# Patient Record
Sex: FEMALE | Race: OTHER | HISPANIC OR LATINO | URBAN - METROPOLITAN AREA
[De-identification: names, ages, dates, MRNs, and addresses within clinical notes are randomized per-mention and may not be internally consistent; named-entity substitution may affect disease eponyms.]

---

## 2021-09-05 ENCOUNTER — EMERGENCY (EMERGENCY)
Facility: HOSPITAL | Age: 51
LOS: 1 days | Discharge: HOME | End: 2021-09-05
Attending: EMERGENCY MEDICINE | Admitting: EMERGENCY MEDICINE
Payer: COMMERCIAL

## 2021-09-05 VITALS
HEART RATE: 74 BPM | DIASTOLIC BLOOD PRESSURE: 82 MMHG | TEMPERATURE: 98 F | SYSTOLIC BLOOD PRESSURE: 110 MMHG | OXYGEN SATURATION: 100 % | RESPIRATION RATE: 18 BRPM

## 2021-09-05 VITALS
HEART RATE: 90 BPM | DIASTOLIC BLOOD PRESSURE: 74 MMHG | SYSTOLIC BLOOD PRESSURE: 136 MMHG | OXYGEN SATURATION: 100 % | TEMPERATURE: 98 F | RESPIRATION RATE: 18 BRPM

## 2021-09-05 LAB
ALBUMIN SERPL ELPH-MCNC: 4.9 G/DL — SIGNIFICANT CHANGE UP (ref 3.3–5)
ALP SERPL-CCNC: 84 U/L — SIGNIFICANT CHANGE UP (ref 40–120)
ALT FLD-CCNC: 16 U/L — SIGNIFICANT CHANGE UP (ref 4–33)
ANION GAP SERPL CALC-SCNC: 12 MMOL/L — SIGNIFICANT CHANGE UP (ref 7–14)
APPEARANCE UR: CLEAR — SIGNIFICANT CHANGE UP
AST SERPL-CCNC: 21 U/L — SIGNIFICANT CHANGE UP (ref 4–32)
BASOPHILS # BLD AUTO: 0.05 K/UL — SIGNIFICANT CHANGE UP (ref 0–0.2)
BASOPHILS NFR BLD AUTO: 0.5 % — SIGNIFICANT CHANGE UP (ref 0–2)
BILIRUB DIRECT SERPL-MCNC: <0.2 MG/DL — SIGNIFICANT CHANGE UP (ref 0–0.2)
BILIRUB INDIRECT FLD-MCNC: >0.1 MG/DL — SIGNIFICANT CHANGE UP (ref 0–1)
BILIRUB SERPL-MCNC: 0.3 MG/DL — SIGNIFICANT CHANGE UP (ref 0.2–1.2)
BILIRUB SERPL-MCNC: 0.3 MG/DL — SIGNIFICANT CHANGE UP (ref 0.2–1.2)
BILIRUB UR-MCNC: NEGATIVE — SIGNIFICANT CHANGE UP
BUN SERPL-MCNC: 10 MG/DL — SIGNIFICANT CHANGE UP (ref 7–23)
CALCIUM SERPL-MCNC: 10.1 MG/DL — SIGNIFICANT CHANGE UP (ref 8.4–10.5)
CHLORIDE SERPL-SCNC: 103 MMOL/L — SIGNIFICANT CHANGE UP (ref 98–107)
CO2 SERPL-SCNC: 28 MMOL/L — SIGNIFICANT CHANGE UP (ref 22–31)
COLOR SPEC: COLORLESS — SIGNIFICANT CHANGE UP
CREAT SERPL-MCNC: 0.85 MG/DL — SIGNIFICANT CHANGE UP (ref 0.5–1.3)
DIFF PNL FLD: NEGATIVE — SIGNIFICANT CHANGE UP
EOSINOPHIL # BLD AUTO: 0.08 K/UL — SIGNIFICANT CHANGE UP (ref 0–0.5)
EOSINOPHIL NFR BLD AUTO: 0.9 % — SIGNIFICANT CHANGE UP (ref 0–6)
GLUCOSE SERPL-MCNC: 99 MG/DL — SIGNIFICANT CHANGE UP (ref 70–99)
GLUCOSE UR QL: NEGATIVE — SIGNIFICANT CHANGE UP
HCT VFR BLD CALC: 39.9 % — SIGNIFICANT CHANGE UP (ref 34.5–45)
HGB BLD-MCNC: 13 G/DL — SIGNIFICANT CHANGE UP (ref 11.5–15.5)
IANC: 5.33 K/UL — SIGNIFICANT CHANGE UP (ref 1.5–8.5)
IMM GRANULOCYTES NFR BLD AUTO: 0.2 % — SIGNIFICANT CHANGE UP (ref 0–1.5)
KETONES UR-MCNC: NEGATIVE — SIGNIFICANT CHANGE UP
LEUKOCYTE ESTERASE UR-ACNC: NEGATIVE — SIGNIFICANT CHANGE UP
LYMPHOCYTES # BLD AUTO: 3.29 K/UL — SIGNIFICANT CHANGE UP (ref 1–3.3)
LYMPHOCYTES # BLD AUTO: 36.1 % — SIGNIFICANT CHANGE UP (ref 13–44)
MAGNESIUM SERPL-MCNC: 2 MG/DL — SIGNIFICANT CHANGE UP (ref 1.6–2.6)
MCHC RBC-ENTMCNC: 28.5 PG — SIGNIFICANT CHANGE UP (ref 27–34)
MCHC RBC-ENTMCNC: 32.6 GM/DL — SIGNIFICANT CHANGE UP (ref 32–36)
MCV RBC AUTO: 87.5 FL — SIGNIFICANT CHANGE UP (ref 80–100)
MONOCYTES # BLD AUTO: 0.35 K/UL — SIGNIFICANT CHANGE UP (ref 0–0.9)
MONOCYTES NFR BLD AUTO: 3.8 % — SIGNIFICANT CHANGE UP (ref 2–14)
NEUTROPHILS # BLD AUTO: 5.33 K/UL — SIGNIFICANT CHANGE UP (ref 1.8–7.4)
NEUTROPHILS NFR BLD AUTO: 58.5 % — SIGNIFICANT CHANGE UP (ref 43–77)
NITRITE UR-MCNC: NEGATIVE — SIGNIFICANT CHANGE UP
NRBC # BLD: 0 /100 WBCS — SIGNIFICANT CHANGE UP
NRBC # FLD: 0 K/UL — SIGNIFICANT CHANGE UP
PH UR: 6.5 — SIGNIFICANT CHANGE UP (ref 5–8)
PHOSPHATE SERPL-MCNC: 4.1 MG/DL — SIGNIFICANT CHANGE UP (ref 2.5–4.5)
PLATELET # BLD AUTO: 206 K/UL — SIGNIFICANT CHANGE UP (ref 150–400)
POTASSIUM SERPL-MCNC: 4.4 MMOL/L — SIGNIFICANT CHANGE UP (ref 3.5–5.3)
POTASSIUM SERPL-SCNC: 4.4 MMOL/L — SIGNIFICANT CHANGE UP (ref 3.5–5.3)
PROT SERPL-MCNC: 8.4 G/DL — HIGH (ref 6–8.3)
PROT UR-MCNC: NEGATIVE — SIGNIFICANT CHANGE UP
RBC # BLD: 4.56 M/UL — SIGNIFICANT CHANGE UP (ref 3.8–5.2)
RBC # FLD: 12.7 % — SIGNIFICANT CHANGE UP (ref 10.3–14.5)
SODIUM SERPL-SCNC: 143 MMOL/L — SIGNIFICANT CHANGE UP (ref 135–145)
SP GR SPEC: 1.01 — SIGNIFICANT CHANGE UP (ref 1–1.05)
UROBILINOGEN FLD QL: SIGNIFICANT CHANGE UP
WBC # BLD: 9.12 K/UL — SIGNIFICANT CHANGE UP (ref 3.8–10.5)
WBC # FLD AUTO: 9.12 K/UL — SIGNIFICANT CHANGE UP (ref 3.8–10.5)

## 2021-09-05 PROCEDURE — 99284 EMERGENCY DEPT VISIT MOD MDM: CPT

## 2021-09-05 PROCEDURE — 70450 CT HEAD/BRAIN W/O DYE: CPT | Mod: 26

## 2021-09-05 PROCEDURE — 71046 X-RAY EXAM CHEST 2 VIEWS: CPT | Mod: 26

## 2021-09-05 RX ORDER — SODIUM CHLORIDE 9 MG/ML
1000 INJECTION INTRAMUSCULAR; INTRAVENOUS; SUBCUTANEOUS ONCE
Refills: 0 | Status: COMPLETED | OUTPATIENT
Start: 2021-09-05 | End: 2021-09-05

## 2021-09-05 RX ADMIN — SODIUM CHLORIDE 1000 MILLILITER(S): 9 INJECTION INTRAMUSCULAR; INTRAVENOUS; SUBCUTANEOUS at 14:40

## 2021-09-05 NOTE — ED ADULT NURSE NOTE - NSIMPLEMENTINTERV_GEN_ALL_ED
Implemented All Universal Safety Interventions:  [place] to call system. Call bell, personal items and telephone within reach. Instruct patient to call for assistance. Room bathroom lighting operational. Non-slip footwear when patient is off stretcher. Physically safe environment: no spills, clutter or unnecessary equipment. Stretcher in lowest position, wheels locked, appropriate side rails in place.

## 2021-09-05 NOTE — ED PROVIDER NOTE - PHYSICAL EXAMINATION
Gen: non toxic appearing, NAD   Head: NC/NT  Eyes: PERRL, EOMI, anicteric  ENT: airway patent, mmm  CV: RRR, +S1/S2  Resp: CTAB, symmetric breath sounds  GI: abdomen soft non-distended, NTTP  Back: no CVA tenderness  Extremities - no edema  Skin: no obvious rashes, colors changes or bruising of back, abdomen, extermiteis  Neuro: A&Ox4, following commands, speech clear, moving all four extremities spontaneously, 5/5 strength, sensation intact  Psych: appropriate mood, normal insight

## 2021-09-05 NOTE — ED PROVIDER NOTE - NS ED ROS FT
Gen: Denies fever, chills  CV: Denies chest pain  Skin: Denies rash, erythema  Resp: Denies SOB, cough  ENT: Denies nasal congestion  Eyes: Denies blurry vision  GI: Denies nausea, vomiting, diarrhea  Msk: Denies LE swelling  : Denies dysuria  Neuro: denies headache

## 2021-09-05 NOTE — ED PROVIDER NOTE - NSFOLLOWUPINSTRUCTIONS_ED_ALL_ED_FT
You were seen for headache that was resolved at the time of examination.     See attached instructions for more information.    No signs of emergency medical condition on today's workup.  Your results are attached with your discharge instructions, please review them with your primary care physician. If there is a result pending, you will receive a call if test is positive.    A presumptive diagnosis is made today, but further evaluation may be required by your primary care doctor and/or specialist for a definitive diagnosis. Therefore, follow up as directed and if symptoms change/worsen or any emergency conditions, please return to the ER.    For pain or fever you can ibuprofen (motrin, advil) or tylenol as needed, as directed on packaging.    If needed, call patient access services at [phone] to find a primary care doctor, or call at [phone] to make an appointment at the clinic.

## 2021-09-05 NOTE — ED PROVIDER NOTE - PROGRESS NOTE DETAILS
Gertrudis White DO PGY-3: no new complains. follow up with primary care physician. return precuations Gertrudis White DO PGY-3: no new complains. follow up with primary care physician. return precautions

## 2021-09-05 NOTE — ED PROVIDER NOTE - ATTENDING CONTRIBUTION TO CARE
This is a 51 y/o F PMHx viral encephalitis (s/p treatment and hospitalization in [place]) p/w dizziness and head pressure for 3 hours that has since resolved. Denies any head trauma, LOC, nausea, vomiting, fevers, chills, chest pain, SOB, abdominal pain, urinary complaints or lower extremity edema. Also endorses her palms look more yellow than normal. On exam, NAD and nonfocal neurological exam including cerebellar tests. Plan- Labs to evaluate for electrolytes, CXR/UA to r/o infectious etiology, CTH, IVF hydration, Reassess This is a 49 y/o F PMHx viral encephalitis (s/p treatment and hospitalization in [place]) p/w dizziness and head pressure for 3 hours that has since resolved. Denies any head trauma, LOC, nausea, vomiting, fevers, chills, chest pain, SOB, abdominal pain, urinary complaints or lower extremity edema. Also endorses her palms look more yellow than normal. On exam, no scleral icterus, no abdominal tenderness, NAD and nonfocal neurological exam including cerebellar tests. Plan- Labs to evaluate for electrolytes, CXR/UA to r/o infectious etiology, CTH, IVF hydration, Reassess

## 2021-09-05 NOTE — ED PROVIDER NOTE - OBJECTIVE STATEMENT
51 yo F with PMHx viral encephalitis (s/p treatment and hospitalization in [place]) presents with an episode of pressure in her head this morning that has been resolved for past few hours. Only complaining of yellowing of her palms and soles at this time. No pain any where. States she was treated for viral encephalitis in early august and wanted to be sure she is ok. No f/c/cp/sob/ab pain/n/v/d/urinary complains.   Pt insisted on her daughter translating over the phone over the translation service.

## 2021-09-05 NOTE — ED PROVIDER NOTE - PATIENT PORTAL LINK FT
You can access the FollowMyHealth Patient Portal offered by [facility] by registering at the following website: [email or web address]. By joining [facility]’s FollowMyHealth portal, you will also be able to view your health information using other applications (apps) compatible with our system.

## 2021-09-05 NOTE — ED PROVIDER NOTE - CLINICAL SUMMARY MEDICAL DECISION MAKING FREE TEXT BOX
Pt is asxs at this time. HDS. Will check for electrolyte, WBC, CT head, CxR, IVF. IF workup is negative, reassess and follow up with primary care physician.

## 2021-09-05 NOTE — ED ADULT NURSE NOTE - OBJECTIVE STATEMENT
Break coverage- Pt received to room 4 AAO x 4 c/o episode of dizziness and head pressure this AM. Pt states symptoms have resolved and denies complaints presently. REports she was recently hospitalized and treated for viral encephalopathy. Pt breathing with ease on room air, Labs sent and NS infusing, eval in progress

## 2021-09-05 NOTE — ED ADULT TRIAGE NOTE - CHIEF COMPLAINT QUOTE
pt c/o dizziness , described as weakness and lights are very bright/ pt was [place] [facility] [place] and [place]  encephalitis for 7 days/   pt states tuesday had episode of dizziness. pt feeling week, and head is fuzzy pt c/o dizziness , described as weakness and lights are very bright/ [facility] [place] [facility]/   pt states tuesday had episode of dizziness. pt feeling week, and head is fuzzy      daughter Otilia [phone]

## 2021-09-05 NOTE — ED ADULT NURSE NOTE - CHIEF COMPLAINT QUOTE
pt c/o dizziness , described as weakness and lights are very bright/ [facility] [place] [facility]/   pt states tuesday had episode of dizziness. pt feeling week, and head is fuzzy      daughter Otilia [phone]

## 2021-12-27 ENCOUNTER — INPATIENT (INPATIENT)
Facility: HOSPITAL | Age: 51
LOS: 0 days | Discharge: HOME | End: 2021-12-28
Attending: STUDENT IN AN ORGANIZED HEALTH CARE EDUCATION/TRAINING PROGRAM | Admitting: STUDENT IN AN ORGANIZED HEALTH CARE EDUCATION/TRAINING PROGRAM
Payer: SELF-PAY

## 2021-12-27 VITALS
DIASTOLIC BLOOD PRESSURE: 91 MMHG | OXYGEN SATURATION: 97 % | RESPIRATION RATE: 18 BRPM | TEMPERATURE: 98 F | SYSTOLIC BLOOD PRESSURE: 133 MMHG | HEART RATE: 114 BPM

## 2021-12-27 PROCEDURE — 70450 CT HEAD/BRAIN W/O DYE: CPT | Mod: 26,MA

## 2021-12-27 PROCEDURE — 99285 EMERGENCY DEPT VISIT HI MDM: CPT | Mod: 25

## 2021-12-27 PROCEDURE — 93010 ELECTROCARDIOGRAM REPORT: CPT

## 2021-12-27 NOTE — ED ADULT TRIAGE NOTE - CHIEF COMPLAINT QUOTE
Pt c/o L leg numbness beginning at around 10PM. Reports she woke up from sleep, felt numbness/tingling sensation, went to get out of bed and fell onto ground. Did not hit head, no LOC. Reports dizziness. Appears lethargic. Denies weakness, blurry vision, headache, nausea/vomiting. Has hx of similar symptoms in past in August 2021, was told she had encephalitis. Denies any other PMHx.  Dr. Moses called for stroke eval. Pt c/o L leg numbness beginning at around 10PM. Reports she woke up from sleep, felt numbness/tingling sensation, went to get out of bed and fell onto ground. Did not hit head, no LOC. Reports dizziness. Appears lethargic. Denies weakness, blurry vision, headache, nausea/vomiting. Has hx of similar symptoms in past in August 2021, was told she had encephalitis. Denies any other PMHx.  Dr. Moses called for stroke eval. Code stroke activated, charge RN aware.

## 2021-12-27 NOTE — ED ADULT NURSE NOTE - IS THE PATIENT ABLE TO BE SCREENED?
Wound care reviewed in detail, as well as specific red flags that would warrant immediate follow up.  Patient  verbalized understanding with rationale and agreed to plan.  To return to [facility] or PCP  For any worsening sign or symptoms.      · Keep area clean a between you and the blood. Step 1. Control bleeding  · Apply direct pressure to a cut or scrape to stop bleeding. · Allow a minor puncture wound to stop bleeding on its own, unless the bleeding is heavy. This may help clean out the wound. Step 2.  Clean Yes

## 2021-12-27 NOTE — ED ADULT NURSE NOTE - CHIEF COMPLAINT QUOTE
Pt c/o L leg numbness beginning at around 10PM. Reports she woke up from sleep, felt numbness/tingling sensation, went to get out of bed and fell onto ground. Did not hit head, no LOC. Reports dizziness. Appears lethargic. Denies weakness, blurry vision, headache, nausea/vomiting. Has hx of similar symptoms in past in August 2021, was told she had encephalitis. Denies any other PMHx.  Dr. Moses called for stroke eval. Code stroke activated, charge RN aware.

## 2021-12-27 NOTE — CONSULT NOTE ADULT - ASSESSMENT
Assessment: 51 year old F with a PMH of viral encephalitis who presented on 12/27 with left lower extremity numbness and a fall. LKN 21:00 on 12/27. Physical exam significant for mildly decreased sensation to PP in the LLE. Initial vital signs significant for HR of 114 and BP of 133/91. CTH w/o showed no acute pathology.     Impression: LLE numbness possibly secondary to right brain dysfunction of unclear etiology, rule out stroke    Plan:  Imaging:  CTA H/N w/ contrast  MRI brain w/o contrast  TTE w/ bubble   LE duplex    Medications:  Aspirin 81 mg daily for now  Consider statin and additional antiplatelet depending on imaging    Labs:  Lipid panel  Hemoglobin A1c  Hypercoagulability panel    Other:  Dysphagia screen, if fails then swallow evaluation  Neurochecks q4h  Telemetry monitoring  Blood glucose control, not to exceed 180, avoid hypoglycemia  Permissive HTN (BP not to exceed 220/120 for 24 hours, then gradual normotension  PT/OT  Outpatient stroke neurology follow up when ready for discharge ([phone], Dr. Benjamin, [address])    Case discussed with telestroke attending, Dr. Zapien  Assessment: 51 year old F with a PMH of viral encephalitis who presented on 12/27 with left lower extremity numbness and a fall. LKN 21:00 on 12/27. Physical exam significant for mildly decreased sensation to PP in the LLE. Initial vital signs significant for HR of 114 and BP of 133/91. CTH w/o showed no acute pathology. Patient not a tPA candidate due to mild nondisabling deficits. Patient not an endovascular candidate due to low NIHSS.     Impression: LLE numbness possibly secondary to right brain dysfunction of unclear etiology, rule out stroke    Plan:  Imaging:  CTA H/N w/ contrast  MRI brain w/o contrast  TTE w/ bubble   LE duplex    Medications:  Aspirin 81 mg daily for now  Consider statin and additional antiplatelet depending on imaging    Labs:  Lipid panel  Hemoglobin A1c  Hypercoagulability panel    Other:  Dysphagia screen, if fails then swallow evaluation  Neurochecks q4h  Telemetry monitoring  Blood glucose control, not to exceed 180, avoid hypoglycemia  Permissive HTN (BP not to exceed 220/120 for 24 hours, then gradual normotension  PT/OT  Outpatient stroke neurology follow up when ready for discharge ([phone], Dr. Benjamin, [address])    Case discussed with telestroke attending, Dr. Zapien  Assessment: 51 year old F with a PMH of viral encephalitis who presented on 12/27 with left lower extremity numbness and a fall. LKN 21:00 on 12/27. Physical exam significant for mildly decreased sensation to PP in the LLE. Initial vital signs significant for HR of 114 and BP of 133/91. CTH w/o showed no acute pathology, but a possible right lateral ventricle intraventricular mass versus asymmetric choroid. Patient not a tPA candidate due to mild nondisabling deficits. Patient not an endovascular candidate due to low NIHSS.     Impression: LLE numbness possibly secondary to right brain dysfunction of unclear etiology, rule out stroke    Plan:  Imaging:  MRA head w/o contrast and MRA neck with contrast (if not possible to use contrast, then obtain without contrast)  MRI brain w/ and w/o contrast (if possible, otherwise without contrast)  TTE w/ bubble   Consider LE duplex    Medications:  Aspirin 81 mg daily for now  Consider statin and additional antiplatelet depending on imaging    Labs:  Lipid panel  Hemoglobin A1c  Hypercoagulability panel    Other:  Dysphagia screen, if fails then swallow evaluation  Neurochecks q4h  Telemetry monitoring  Blood glucose not to exceed 180, avoid hypoglycemia  Permissive HTN (BP not to exceed 220/120 for 24 hours, then gradual normotension  Consider PT/OT  Outpatient stroke neurology follow up when ready for discharge ([phone], Dr. Benjamin, [address])    Case discussed with telestroke attending, Dr. Zapien  Assessment: 51 year old right-handed F with a PMH of  herpes encephalitis (c/b kidney injury, possibly medication related, peak creatinine of 6, 8/2021 in the [place]) and hypothyroidism (on synthroid) who presented on 12/27 with left lower extremity numbness and a fall. LKN 21:00 on 12/27. Physical exam significant for tachycardia and mildly decreased sensation to PP in the LLE. Initial vital signs significant for HR of 114 and BP of 133/91. Labs significant for BUN/Cr of 16/1.13. CTH w/o showed no acute pathology, but a possible right lateral ventricle intraventricular mass versus asymmetric choroid. Patient not a tPA candidate due to mild nondisabling deficits. Patient not an endovascular candidate due to low NIHSS.     Impression: LLE numbness possibly secondary to right brain dysfunction of unclear etiology, rule out stroke    Plan:  Imaging:  MRA head w/o contrast and MRA neck with contrast (if not possible to use contrast, then obtain without contrast)  MRI brain w/ and w/o contrast (if possible, otherwise without contrast)  TTE w/ bubble   Consider LE duplex    Medications:  Aspirin 81 mg daily for now  Consider statin and additional antiplatelet depending on imaging    Labs:  Lipid panel  Hemoglobin A1c  Consider Hypercoagulability panel    Other:  Dysphagia screen, if fails then swallow evaluation  Neurochecks q4h  Telemetry monitoring  Blood glucose not to exceed 180, avoid hypoglycemia  Permissive HTN (BP not to exceed 220/120 for 24 hours, then gradual normotension  Consider PT/OT  Outpatient stroke neurology follow up when ready for discharge ([phone], Dr. Benjamin, [address])    Case discussed with telestroke attending, Dr. Zapien

## 2021-12-27 NOTE — ED ADULT NURSE NOTE - OBJECTIVE STATEMENT
facilitator RN: pt received to CT areas for code stroke c/o left leg numbness from 10pm. pt [place] speaking family friend LKW 9pm when pt went to bed endorsing headache and "not feeling well" took Advil and cough suppressant. woke up and hour later to use restroom and fell as a result of leg numbness. denies head trauma, LOC. PMHx encephalitis. not vaccinated for COVID. 18G IV placed left AC, labs drawn and sent. NIHSS as noted. at this time, pt c/o feeling tired.

## 2021-12-27 NOTE — ED ADULT NURSE NOTE - NSIMPLEMENTINTERV_GEN_ALL_ED
Implemented All Universal Safety Interventions:  [place] to call system. Call bell, personal items and telephone within reach. Instruct patient to call for assistance. Room bathroom lighting operational. Non-slip footwear when patient is off stretcher. Physically safe environment: no spills, clutter or unnecessary equipment. Stretcher in lowest position, wheels locked, appropriate side rails in place.

## 2021-12-27 NOTE — CONSULT NOTE ADULT - ATTENDING COMMENTS
code stroke called in ED and neurology emergently assessed patient.   Briefly   51 year old right-handed  F with prior herpes encephalitis (c/b kidney injury, possibly medication related, peak creatinine of 6, 8/2021 in the [place]) and hypothyroidism (on synthroid) who presented on 12/27 with left lower extremity numbness and a fall. LKN 21:00 on 12/27. Physical exam significant for tachycardia and mildly decreased sensation to PP in the LLE. Initial vital signs significant for HR of 114 and BP of 133/91. Labs significant for BUN/Cr of 16/1.13.   CTH w/o showed no acute pathology, but a possible right lateral ventricle intraventricular mass versus asymmetric choroid. Patient not a tPA candidate due to mild nondisabling deficits. Patient not an endovascular candidate due to low NIHSS.   +COVID     Impression: LLE numbness now resolved.  also + COVID. doubt vascular event/TIA. more likely related to covid   Plan:  - treatment of covid per primary team  - MRI brain and MRA H/N can be done outpatient as symptoms resolved.   - suggest covid quarantine at home given omicron outbreak   - no objection to asa 81   - no need for statin  - f/u with me outpt   spoke with cdu

## 2021-12-27 NOTE — CONSULT NOTE ADULT - SUBJECTIVE AND OBJECTIVE BOX
HPI: 51 year old F with a PMH of viral encephalitis who presented on 12/27 with left lower extremity numbness and a fall. LKN 21:00 on 12/27.      NIHSS: 1  MRS: 0    REVIEW OF SYSTEMS  General:	  Skin/Breast:	  Ophthalmologic:  ENMT:	  Respiratory and Thorax:	  Cardiovascular:	  Gastrointestinal:	  Genitourinary:	  Musculoskeletal:	  Neurological:	  Psychiatric:	  Hematology/Lymphatics:	  Endocrine:	  Allergic/Immunologic:	    A 10-system ROS was performed and is negative except for those items noted above and/or in the HPI.    PAST MEDICAL & SURGICAL HISTORY:    FAMILY HISTORY:    SOCIAL HISTORY:   T/E/D:   Occupation:   Lives with:     MEDICATIONS (HOME):  Home Medications:    MEDICATIONS  (STANDING):    MEDICATIONS  (PRN):    ALLERGIES/INTOLERANCES:  Allergies  No Known Allergies    Intolerances    VITALS & EXAMINATION:  Vital Signs Last 24 Hrs  T(C): 36.9 (27 Dec 2021 23:11), Max: 36.9 (27 Dec 2021 23:11)  T(F): 98.5 (27 Dec 2021 23:11), Max: 98.5 (27 Dec 2021 23:11)  HR: 114 (27 Dec 2021 23:11) (114 - 114)  BP: 133/91 (27 Dec 2021 23:11) (133/91 - 133/91)  BP(mean): --  RR: 18 (27 Dec 2021 23:11) (18 - 18)  SpO2: 97% (27 Dec 2021 23:11) (97% - 97%)    General:  Constitutional: Obese Female, appears stated age, in no apparent distress including pain  Head: Normocephalic & atraumatic.  ENT: Patent ear canals, intact TM, mucus membranes moist & pink, neck supple, no lymphadenopathy.   Respiratory: Patent airway. All lung fields are clear to auscultation bilaterally.  Extremities: No cyanosis, clubbing, or edema.  Skin: No rashes, bruising, or discoloration.    Cardiovascular (>2): RRR no murmurs. Carotid pulsations symmetric, no bruits. Normal capillary beds refill, 1-2 seconds or less.     Neurological (>12):  MS: Awake, alert, oriented to person, place, situation, time. Normal affect. Follows all commands.    Language: Speech is clear, fluent with good repetition & comprehension (able to name objects)    CNs: PERRL (R = 3mm, L = 3mm). VFF. EOMI no nystagmus, no diplopia. V1-3 intact to LT/pinprick, No facial asymmetry b/l, full eye closure strength b/l. Hearing grossly normal (rubbing fingers) b/l. Symmetric palate elevation in midline. Gag reflex deferred. Head turning & shoulder shrug intact b/l. Tongue midline, normal movements, no atrophy.    Fundoscopic: deferred     Motor: Normal muscle bulk & tone.   No motor drift in extremities	     Sensation: decreased to PP in LLE. Intact to LT throughout     Cortical: Extinction on DSS (neglect): unable to assess with sensory deficit    Reflexes:              Biceps(C5)       BR(C6)     Triceps(C7)               Patellar(L4)    Achilles(S1)    Plantar Resp  R	2	          2	             2		        2		    2		Down   L	2	          2	             2		        2		    2		Down     Coordination: intact rapid-alt movements. No dysmetria to FTN/HTS    Gait: Normal Romberg. No postural instability. Normal stance and tandem gait.     LABORATORY:  CBC   Chem       LFTs   Coagulopathy   Lipid Panel   A1c   Cardiac enzymes     U/A   CSF  Immunological  Other    STUDIES & IMAGING:  Studies (EKG, EEG, EMG, etc):     Radiology (XR, CT, MR, U/S, TTE/LUPILLO):   HPI: 51 year old F with a PMH of herpes encephalitis (c/b kidney injury, possibly medication related, peak creatinine of 6, 8/2021 in the [place]) and hypothyroidism (on synthroid) who presented on 12/27 with left lower extremity numbness and a fall. LKN 21:00 on 12/27. Patient noticed LLE numbness upon awakening and getting out of bed around 10 pm. She subsequently fell. Patient also noticed a frontal pressure like headache, which is currently 1/10 in intensity. She denies having any weakness, vision changes, nausea, or speech changes. Patient endorsed having chest pain and pressure with sitting up, which has now resolved. She denies shortness of breath. Patient does not take any blood thinners. She is independent in ADL's and ambulates independently at baseline. Patient states that last week her creatinine was 1.8.     Patient's family friend provided Japanese interpretation per patient preference    NIHSS: 1  MRS: 0    REVIEW OF SYSTEMS  A 10-system ROS was performed and is negative except for those items noted above and/or in the HPI.    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 with: daughter    MEDICATIONS (HOME):  Home Medications:    MEDICATIONS  (STANDING):    MEDICATIONS  (PRN):    ALLERGIES/INTOLERANCES:  Allergies  No Known Allergies    Intolerances    VITALS & EXAMINATION:  Vital Signs Last 24 Hrs  T(C): 36.9 (27 Dec 2021 23:11), Max: 36.9 (27 Dec 2021 23:11)  T(F): 98.5 (27 Dec 2021 23:11), Max: 98.5 (27 Dec 2021 23:11)  HR: 114 (27 Dec 2021 23:11) (114 - 114)  BP: 133/91 (27 Dec 2021 23:11) (133/91 - 133/91)  BP(mean): --  RR: 18 (27 Dec 2021 23:11) (18 - 18)  SpO2: 97% (27 Dec 2021 23:11) (97% - 97%)    General:  Constitutional: Female, appears stated age, in no apparent distress including pain  Respiratory: Patent airway. Anterior lung fields are clear to auscultation bilaterally.  Cardiovascular (>2): tachycardic, no murmurs, rubs, clicks, or gallops.  Abdomen: normal bowel sounds in all quadrants, soft, nontender    Neurological (>12):  MS: Awake, alert, oriented to person, place, situation, time. Normal affect. Follows all commands.    Language: Speech is clear, fluent with good comprehension    CNs: PERRL (R = 3mm, L = 3mm). CVF full. EOMI no nystagmus. V1-3 intact to LT/pinprick, No facial asymmetry b/l, full eye closure strength b/l. Hearing grossly normal (rubbing fingers) b/l. Gag reflex deferred. Head turning intact b/l. Tongue midline, normal movements, no atrophy.    Fundoscopic: deferred     Motor: Normal muscle bulk & tone. No pronator drift. No motor drift in extremities.	     Sensation: decreased to PP in LLE. Intact to LT b/l throughout     Cortical: Extinction on DSS (neglect): unable to assess with sensory deficit    Reflexes:              Biceps(C5)       BR(C6)     Triceps(C7)               Patellar(L4)    Achilles(S1)    Plantar Resp  R	2	          2	             2		        3		    2		mute  L	2	          2	             2		        3		    2		mute     No ankle clonus b/l    Coordination: No dysmetria to FTN/HTS b/l    Gait: Normal stance and gait.     LABORATORY:  CBC   Chem       LFTs   Coagulopathy   Lipid Panel   A1c   Cardiac enzymes     U/A   CSF  Immunological  Other    STUDIES & IMAGING:  Studies (EKG, EEG, EMG, etc):     Radiology (XR, CT, MR, U/S, TTE/LUPILLO):  < from: CT Brain Stroke Protocol (12.27.21 @ 23:51) >  IMPRESSION:  No intracranial hemorrhage, vasogenic edema or extra-axial collection.  Questionable right lateral ventricle intraventricular mass versus   asymmetric choroid. Follow-up with contrast-enhanced brain MRI on a   nonemergent basis is recommended for further evaluation.    < end of copied text >   HPI: 51 year old right-handed F with a PMH of herpes encephalitis (c/b kidney injury, possibly medication related, peak creatinine of 6, 8/2021 in the [place]) and hypothyroidism (on synthroid) who presented on 12/27 with left lower extremity numbness and a fall. LKN 21:00 on 12/27. Patient noticed LLE numbness upon awakening and getting out of bed around 10 pm. She subsequently fell. Patient also noticed a frontal pressure like headache, which is currently 1/10 in intensity. She denies having any weakness, vision changes, nausea, or speech changes. Patient endorsed having chest pain and pressure with sitting up, which has now resolved. She denies shortness of breath. Patient does not take any blood thinners. She is independent in ADL's and ambulates independently at baseline. Patient states that last week her creatinine was 1.8.     Patient's family friend provided Montenegrin interpretation per patient preference    NIHSS: 1  MRS: 0    REVIEW OF SYSTEMS  A 10-system ROS was performed and is negative except for those items noted above and/or in the HPI.    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 with: daughter    MEDICATIONS (HOME):  Home Medications:    MEDICATIONS  (STANDING):    MEDICATIONS  (PRN):    ALLERGIES/INTOLERANCES:  Allergies  No Known Allergies    Intolerances    VITALS & EXAMINATION:  Vital Signs Last 24 Hrs  T(C): 36.9 (27 Dec 2021 23:11), Max: 36.9 (27 Dec 2021 23:11)  T(F): 98.5 (27 Dec 2021 23:11), Max: 98.5 (27 Dec 2021 23:11)  HR: 114 (27 Dec 2021 23:11) (114 - 114)  BP: 133/91 (27 Dec 2021 23:11) (133/91 - 133/91)  BP(mean): --  RR: 18 (27 Dec 2021 23:11) (18 - 18)  SpO2: 97% (27 Dec 2021 23:11) (97% - 97%)    General:  Constitutional: Female, appears stated age, in no apparent distress including pain  Respiratory: Patent airway. Anterior lung fields are clear to auscultation bilaterally.  Cardiovascular (>2): tachycardic, no murmurs, rubs, clicks, or gallops.  Abdomen: normal bowel sounds in all quadrants, soft, nontender    Neurological (>12):  MS: Awake, alert, oriented to person, place, situation, time. Normal affect. Follows all commands.    Language: Speech is clear, fluent with good comprehension    CNs: PERRL (R = 3mm, L = 3mm). CVF full. EOMI no nystagmus. V1-3 intact to LT/pinprick, No facial asymmetry b/l, full eye closure strength b/l. Hearing grossly normal (rubbing fingers) b/l. Gag reflex deferred. Head turning intact b/l. Tongue midline, normal movements, no atrophy.    Fundoscopic: deferred     Motor: Normal muscle bulk & tone. No pronator drift. No motor drift in extremities.	     Sensation: decreased to PP in LLE. Intact to LT b/l throughout     Cortical: Extinction on DSS (neglect): unable to assess with sensory deficit    Reflexes:              Biceps(C5)       BR(C6)     Triceps(C7)               Patellar(L4)    Achilles(S1)    Plantar Resp  R	2	          2	             2		        3		    2		mute  L	2	          2	             2		        3		    2		mute     No ankle clonus b/l    Coordination: No dysmetria to FTN/HTS b/l    Gait: Normal stance and gait.     LABORATORY:  CBC   Chem       LFTs   Coagulopathy   Lipid Panel   A1c   Cardiac enzymes     U/A   CSF  Immunological  Other    STUDIES & IMAGING:  Studies (EKG, EEG, EMG, etc):     Radiology (XR, CT, MR, U/S, TTE/LUPILLO):  < from: CT Brain Stroke Protocol (12.27.21 @ 23:51) >  IMPRESSION:  No intracranial hemorrhage, vasogenic edema or extra-axial collection.  Questionable right lateral ventricle intraventricular mass versus   asymmetric choroid. Follow-up with contrast-enhanced brain MRI on a   nonemergent basis is recommended for further evaluation.    < end of copied text >

## 2021-12-28 ENCOUNTER — TRANSCRIPTION ENCOUNTER (OUTPATIENT)
Age: 51
End: 2021-12-28

## 2021-12-28 VITALS — WEIGHT: 132.94 LBS | HEIGHT: 65 IN

## 2021-12-28 DIAGNOSIS — R20.0 ANESTHESIA OF SKIN: ICD-10-CM

## 2021-12-28 DIAGNOSIS — E03.9 HYPOTHYROIDISM, UNSPECIFIED: ICD-10-CM

## 2021-12-28 DIAGNOSIS — Z29.9 ENCOUNTER FOR PROPHYLACTIC MEASURES, UNSPECIFIED: ICD-10-CM

## 2021-12-28 DIAGNOSIS — U07.1 COVID-19: ICD-10-CM

## 2021-12-28 DIAGNOSIS — Z86.19 PERSONAL HISTORY OF OTHER INFECTIOUS AND PARASITIC DISEASES: ICD-10-CM

## 2021-12-28 DIAGNOSIS — R29.898 OTHER SYMPTOMS AND SIGNS INVOLVING THE MUSCULOSKELETAL SYSTEM: ICD-10-CM

## 2021-12-28 LAB
ALBUMIN SERPL ELPH-MCNC: 4.4 G/DL — SIGNIFICANT CHANGE UP (ref 3.3–5)
ALP SERPL-CCNC: 77 U/L — SIGNIFICANT CHANGE UP (ref 40–120)
ALT FLD-CCNC: 21 U/L — SIGNIFICANT CHANGE UP (ref 4–33)
ANION GAP SERPL CALC-SCNC: 9 MMOL/L — SIGNIFICANT CHANGE UP (ref 7–14)
APTT BLD: 34.5 SEC — SIGNIFICANT CHANGE UP (ref 27–36.3)
AST SERPL-CCNC: 21 U/L — SIGNIFICANT CHANGE UP (ref 4–32)
BASOPHILS # BLD AUTO: 0.04 K/UL — SIGNIFICANT CHANGE UP (ref 0–0.2)
BASOPHILS NFR BLD AUTO: 0.6 % — SIGNIFICANT CHANGE UP (ref 0–2)
BILIRUB SERPL-MCNC: <0.2 MG/DL — SIGNIFICANT CHANGE UP (ref 0.2–1.2)
BUN SERPL-MCNC: 16 MG/DL — SIGNIFICANT CHANGE UP (ref 7–23)
CALCIUM SERPL-MCNC: 9.3 MG/DL — SIGNIFICANT CHANGE UP (ref 8.4–10.5)
CHLORIDE SERPL-SCNC: 103 MMOL/L — SIGNIFICANT CHANGE UP (ref 98–107)
CO2 SERPL-SCNC: 28 MMOL/L — SIGNIFICANT CHANGE UP (ref 22–31)
CREAT SERPL-MCNC: 1.13 MG/DL — SIGNIFICANT CHANGE UP (ref 0.5–1.3)
EOSINOPHIL # BLD AUTO: 0.1 K/UL — SIGNIFICANT CHANGE UP (ref 0–0.5)
EOSINOPHIL NFR BLD AUTO: 1.5 % — SIGNIFICANT CHANGE UP (ref 0–6)
FLUAV AG NPH QL: SIGNIFICANT CHANGE UP
FLUBV AG NPH QL: SIGNIFICANT CHANGE UP
GLUCOSE SERPL-MCNC: 101 MG/DL — HIGH (ref 70–99)
HCT VFR BLD CALC: 41.3 % — SIGNIFICANT CHANGE UP (ref 34.5–45)
HGB BLD-MCNC: 13 G/DL — SIGNIFICANT CHANGE UP (ref 11.5–15.5)
IANC: 3.92 K/UL — SIGNIFICANT CHANGE UP (ref 1.5–8.5)
IMM GRANULOCYTES NFR BLD AUTO: 0.1 % — SIGNIFICANT CHANGE UP (ref 0–1.5)
INR BLD: 0.97 RATIO — SIGNIFICANT CHANGE UP (ref 0.88–1.16)
LYMPHOCYTES # BLD AUTO: 1.9 K/UL — SIGNIFICANT CHANGE UP (ref 1–3.3)
LYMPHOCYTES # BLD AUTO: 28.4 % — SIGNIFICANT CHANGE UP (ref 13–44)
MCHC RBC-ENTMCNC: 28.9 PG — SIGNIFICANT CHANGE UP (ref 27–34)
MCHC RBC-ENTMCNC: 31.5 GM/DL — LOW (ref 32–36)
MCV RBC AUTO: 91.8 FL — SIGNIFICANT CHANGE UP (ref 80–100)
MONOCYTES # BLD AUTO: 0.73 K/UL — SIGNIFICANT CHANGE UP (ref 0–0.9)
MONOCYTES NFR BLD AUTO: 10.9 % — SIGNIFICANT CHANGE UP (ref 2–14)
NEUTROPHILS # BLD AUTO: 3.92 K/UL — SIGNIFICANT CHANGE UP (ref 1.8–7.4)
NEUTROPHILS NFR BLD AUTO: 58.5 % — SIGNIFICANT CHANGE UP (ref 43–77)
NRBC # BLD: 0 /100 WBCS — SIGNIFICANT CHANGE UP
NRBC # FLD: 0 K/UL — SIGNIFICANT CHANGE UP
PLATELET # BLD AUTO: 199 K/UL — SIGNIFICANT CHANGE UP (ref 150–400)
POTASSIUM SERPL-MCNC: 3.8 MMOL/L — SIGNIFICANT CHANGE UP (ref 3.5–5.3)
POTASSIUM SERPL-SCNC: 3.8 MMOL/L — SIGNIFICANT CHANGE UP (ref 3.5–5.3)
PROT SERPL-MCNC: 7.3 G/DL — SIGNIFICANT CHANGE UP (ref 6–8.3)
PROTHROM AB SERPL-ACNC: 11.2 SEC — SIGNIFICANT CHANGE UP (ref 10.6–13.6)
RBC # BLD: 4.5 M/UL — SIGNIFICANT CHANGE UP (ref 3.8–5.2)
RBC # FLD: 13.2 % — SIGNIFICANT CHANGE UP (ref 10.3–14.5)
RSV RNA NPH QL NAA+NON-PROBE: SIGNIFICANT CHANGE UP
SARS-COV-2 RNA SPEC QL NAA+PROBE: DETECTED
SODIUM SERPL-SCNC: 140 MMOL/L — SIGNIFICANT CHANGE UP (ref 135–145)
TROPONIN T, HIGH SENSITIVITY RESULT: <6 NG/L — SIGNIFICANT CHANGE UP
WBC # BLD: 6.7 K/UL — SIGNIFICANT CHANGE UP (ref 3.8–10.5)
WBC # FLD AUTO: 6.7 K/UL — SIGNIFICANT CHANGE UP (ref 3.8–10.5)

## 2021-12-28 PROCEDURE — 99223 1ST HOSP IP/OBS HIGH 75: CPT | Mod: GC

## 2021-12-28 PROCEDURE — 93306 TTE W/DOPPLER COMPLETE: CPT | Mod: 26

## 2021-12-28 RX ORDER — ACETAMINOPHEN 500 MG
650 TABLET ORAL ONCE
Refills: 0 | Status: COMPLETED | OUTPATIENT
Start: 2021-12-28 | End: 2021-12-28

## 2021-12-28 RX ORDER — LEVOTHYROXINE SODIUM 125 MCG
1 TABLET ORAL
Qty: 0 | Refills: 0 | DISCHARGE

## 2021-12-28 RX ORDER — LEVOTHYROXINE SODIUM 125 MCG
75 TABLET ORAL DAILY
Refills: 0 | Status: DISCONTINUED | OUTPATIENT
Start: 2021-12-28 | End: 2021-12-28

## 2021-12-28 RX ORDER — ASPIRIN/CALCIUM CARB/MAGNESIUM 324 MG
1 TABLET ORAL
Qty: 0 | Refills: 0 | DISCHARGE
Start: 2021-12-28

## 2021-12-28 RX ORDER — ASPIRIN/CALCIUM CARB/MAGNESIUM 324 MG
81 TABLET ORAL DAILY
Refills: 0 | Status: DISCONTINUED | OUTPATIENT
Start: 2021-12-28 | End: 2021-12-28

## 2021-12-28 RX ADMIN — Medication 650 MILLIGRAM(S): at 06:13

## 2021-12-28 NOTE — H&P ADULT - NSHPSOCIALHISTORY_GEN_ALL_CORE
Drinks ETOH socially, admits to 5-6 glasses of whiskey last weekend  Denies tobacco and illicit drug use.

## 2021-12-28 NOTE — ED ADULT NURSE REASSESSMENT NOTE - NS ED NURSE REASSESS COMMENT FT1
Patient at baseline mental status. Patient ambulatory at baseline. Patient presenting with no neurological deficits at this time. Patient denies chest pain, headache, dizziness, numbness/tingling at this time.   NSR on cardiac monitor Lead II

## 2021-12-28 NOTE — H&P ADULT - NSHPLABSRESULTS_GEN_ALL_CORE
13.0   6.70  )-----------( 199      ( 28 Dec 2021 00:03 )             41.3   12-28    140  |  103  |  16  ----------------------------<  101<H>  3.8   |  28  |  1.13    Ca    9.3      28 Dec 2021 00:03    TPro  7.3  /  Alb  4.4  /  TBili  <0.2  /  DBili  x   /  AST  21  /  ALT  21  /  AlkPhos  77  12-28    EKG: NSR @ 103bpm, QTc 474    CTH: No intracranial hemorrhage, vasogenic edema or extra-axial collection.  Questionable right lateral ventricle intraventricular mass versus   asymmetric choroid. Follow-up with contrast-enhanced brain MRI on a   nonemergent basis is recommended for further evaluation.

## 2021-12-28 NOTE — H&P ADULT - PROBLEM SELECTOR PLAN 1
Admit to telemetry r/o CVA. Code stroke called in ED, neurology input appreciated. CTH without acute pathology. Recommend TTE and MRI, MRA H/N (can be done as outpt). FLP, A1C in AM. Neuro recommend ASA, no need for statin at this time.

## 2021-12-28 NOTE — DISCHARGE NOTE NURSING/CASE MANAGEMENT/SOCIAL WORK - NSDCPEFALRISK_GEN_ALL_CORE
For information on Fall & Injury Prevention, visit: [email or web address] OR  [email or web address] OR  https://www.cdc.gov/steadi/patient.html

## 2021-12-28 NOTE — DISCHARGE NOTE NURSING/CASE MANAGEMENT/SOCIAL WORK - PATIENT PORTAL LINK FT
You can access the FollowMyHealth Patient Portal offered by [facility] by registering at the following website: [email or web address]. By joining [employer]’s FollowMyHealth portal, you will also be able to view your health information using other applications (apps) compatible with our system.

## 2021-12-28 NOTE — DISCHARGE NOTE PROVIDER - HOSPITAL COURSE
50yo F with PMHx of herpes encephalitis (c/b kidney injury due to antiviral tx, [place] creatinine of 6, 8[place]) and hypothyroidism p/w left lower extremity weakness, numbness and fall yesterday 12/27 @ 955pm. Admit to telemetry, r/o CVA, found to be COVID+.     On admission: Code stroke was called and Pt was seen by neurology. Pt underwent CTH which revealed no acute pathology: No intracranial hemorrhage, vasogenic edema or extra-axial collection. Questionable right lateral ventricle intraventricular mass versus asymmetric choroid. Follow-up with contrast-enhanced brain MRI on a nonemergent basis is recommended for further evaluation.     Pt found to be COVID +, O2 sat 100% on RA. Supportive care.     Neurology recommended treatment of COVID per primary team. MRI brain and MRA H/N can be done outpatient as symptoms resolved. Suggest COVID quarantine at home given omicron outbreak. No objection to asa 81. No need for statin. Follow up with Dr. Benjamin outpt.     Case discussed with Dr. Corbin, no need for further inpt w/u at this time as symptoms fully resolved and Pt seen and cleared by neurology for MRI/MRA to be completed as outpt, labs/vitals reviewed, Pt medically cleared with discharge to home with outpt f/u with PCP and Neuro.     50yo F with PMHx of herpes encephalitis (c/b kidney injury due to antiviral tx, peak creatinine of 6, [place]) and hypothyroidism p/w left lower extremity weakness, numbness and fall yesterday 12/27 @ 955pm. Admit to telemetry, r/o CVA, found to be COVID+.     On admission: Code stroke was called and Pt was seen by neurology. Pt underwent CTH which revealed no acute pathology: No intracranial hemorrhage, vasogenic edema or extra-axial collection. Questionable right lateral ventricle intraventricular mass versus asymmetric choroid. Follow-up with contrast-enhanced brain MRI on a nonemergent basis is recommended for further evaluation.     Pt found to be COVID +, O2 sat 100% on RA. Supportive care.     Neurology recommended treatment of COVID per primary team. MRI brain and MRA H/N can be done outpatient as symptoms resolved. Suggest COVID quarantine at home given omicron outbreak. No objection to asa 81. No need for statin. Follow up with Dr. Sourav gomez.     Case discussed with Dr. Corbin, no need for further inpt w/u at this time as symptoms fully resolved and Pt seen and cleared by neurology for MRI/MRA to be completed as outpt, labs/vitals reviewed, Pt medically cleared with discharge to home with outpt f/u with PCP and Neuro.      PA addendum: Echocardiogram reviewed, EF 59% Normal trileaflet aortic valve. Normal left ventricular internal dimensions and wall thicknesses. Normal left ventricular systolic function. No segmental wall motion abnormalities. Normal right ventricular size and function. No further intervention, d/w attd, d/c home w/ outpt f/u as noted above.

## 2021-12-28 NOTE — ED PROVIDER NOTE - PHYSICAL EXAMINATION
Const: Well-nourished, Well-developed, appearing stated age.  Eyes: no conjunctival injection, and symmetrical lids.  HEENT: Head NCAT, no lesions. Atraumatic external nose and ears.   Neck: Symmetric, trachea midline.   CVS: +S1/S2, Radial and DP pulses 2+ b/l.   RESP: Unlabored respiratory effort. Clear to auscultation bilaterally.  GI: Nontender/Nondistended, No CVA tenderness b/l.   MSK: Normocephalic/Atraumatic, Lower Extremities w/o edema b/l.   Skin: Warm, dry and intact.   Neuro: Fluent Speech. No dysmetria or dysarthria. Motor intact in all extremities. Ambulation wnl. Sensation to lite touch diminished in LLE compared to RLE. Sensation intact in b/l UE.   Psych: Awake, Alert, & Oriented (AAO) x3. Appropriate mood and affect.

## 2021-12-28 NOTE — ED PROVIDER NOTE - OBJECTIVE STATEMENT
51 year old herpes encephalitis (c/b kidney injury, possibly medication related, peak creatinine of 6, 8/2021 in the [place]) and hypothyroidism (on synthroid) presenting with CC of left lower extremity numbness at 10pm. Patient noticed LLE numbness upon awakening and getting out of bed and fell on to her L side. Able to ambulate since, no LOC/head injury. Pt has had a headache and sore throat all day. Not covid vaccinated. No other medical problems. Code stroke called upon arrival w neuro at bedside.

## 2021-12-28 NOTE — H&P ADULT - ASSESSMENT
52yo F with PMHx of herpes encephalitis (c/b kidney injury due to antiviral tx, [place] of 6, [place]) and hypothyroidism p/w left lower extremity weakness, numbness and fall yesterday 12/27 @ 955pm. Admit to telemetry, r/o CVA, found to be COVID+.

## 2021-12-28 NOTE — H&P ADULT - ATTENDING COMMENTS
50yo F with PMHx of herpes encephalitis (c/b kidney injury due to antiviral, peak creatinine of 6, [place]) and hypothyroidism p/w left lower extremity weakness and numbness. Symptoms all currently resolved. No focal neuro deficits on my exam. Lungs clear, heart sounds normal, no LE edema. CTH w/o contrast showed no acute pathology, but a possible right lateral ventricle intraventricular mass versus asymmetric choroid. Appreciate neuro recs, will start on aspirin and hold on statin. Given symptoms have resolved can f/u outpt for MRI brain and MRA H/N. In regards to COVID, pt denies SOB. Vital signs all stable and not currently warranting inpt monitoring. Pt opting for outpt followup. Counseled extensively on need to obtain additional imaging and f/u with neuro, expressed understanding.

## 2021-12-28 NOTE — ED PROVIDER NOTE - NS ED ROS FT
CONST: no fevers, no chills, no trauma  EYES: no pain, no blurry vision   ENT: no sore throat, no epistaxis, no rhinorrhea  CV: no chest pain, no palpitations, no orthopnea, no extremity pain or swelling  RESP: no shortness of breath, no cough, no sputum, no pleurisy, no wheezing  ABD: no abdominal pain, no nausea, no vomiting, no diarrhea, no black or bloody stool  : no dysuria, no hematuria, no frequency, no urgency  MSK: no back pain, no neck pain, no extremity pain  NEURO: + headache, + sensory disturbances, no focal weakness, no dizziness  HEME: no easy bleeding or bruising  SKIN: no diaphoresis, no rash

## 2021-12-28 NOTE — H&P ADULT - HISTORY OF PRESENT ILLNESS
50yo F with PMHx of herpes encephalitis (c/b kidney injury due to antiviral, peak creatinine of 6, 8[place]21 [place]) and hypothyroidism p/w left lower extremity weakness, numbness and fall yesterday 12/27 @ 955pm. Pt states on Sunday night she began to have myalgias, headache, cough, sore throat, and congestion. Yesterday evening, she tried to get up from bed when she felt numbness, weakness in the LLE and fell on her backside which is now feeling sore, denies head injury. She denies pain/swelling in LLE, but admits to feeling of "heaviness" in her L leg after her fall which has since resolved. She is currently resting in bed with only complaints of mild sore throat/congestion. Pt also admits to drinking 5-6 glasses of whiskey this past weekend, but this is an isolated occurrence typically drinks socially. In the ED, Pt was found to be positive for COVID-19 and was directed by her doctor to not get vaccinated due to recent encephalitis.

## 2021-12-28 NOTE — DISCHARGE NOTE PROVIDER - CARE PROVIDER_API CALL
Allan Benjamin)  Neurology; Vascular Neurology  [address], [address]  [place] [address]  Phone: [phone]  Fax: [phone]  Follow Up Time:

## 2021-12-28 NOTE — H&P ADULT - NSICDXPASTMEDICALHX_GEN_ALL_CORE_FT
PAST MEDICAL HISTORY:  H/O herpes encephalitis Due to herpes zoster, c/b BRIA due to antiviral therapy    Hypothyroidism

## 2021-12-28 NOTE — ED CDU PROVIDER INITIAL DAY NOTE - ATTENDING CONTRIBUTION TO CARE
Patient evaluated by Dr. Cedeno and Neurology resident as code stroke. I supervised the care of this patient from midnight until 8AM on 12/28/21. SUNNY

## 2021-12-28 NOTE — ED PROVIDER NOTE - ATTENDING CONTRIBUTION TO CARE
51 year old female came to the ED because of left leg numbness, no weakness, no changes in speech, no changes in vision. On exam, vitals noted, rrr, lungs cta, abd soft, nt, CN II-XII grossly intact, normal finger to nose, no drift, normal gait, 5/5 strength in all 4 ext, decreased sensation in the LLE, equal reflexes. CODE stroke called and pt immediately seen by neurology.

## 2021-12-28 NOTE — H&P ADULT - RS GEN PE MLT RESP DETAILS PC
airway patent/breath sounds equal/good air movement/clear to auscultation bilaterally/no chest wall tenderness/no rales/no rhonchi/no wheezes/chest wall tenderness

## 2021-12-28 NOTE — DISCHARGE NOTE PROVIDER - NSDCCPCAREPLAN_GEN_ALL_CORE_FT
PRINCIPAL DISCHARGE DIAGNOSIS  Diagnosis: Weakness of left lower extremity  Assessment and Plan of Treatment: Resolved. Follow up with your Neurologist Dr. Benjamin for further monitoring within 1 weeks. Please call to arrange appointment for MRI and MRA of the head and neck as oupatient. Continue aspirin daily.      SECONDARY DISCHARGE DIAGNOSES  Diagnosis: 2019 novel coronavirus disease (COVID-19)  Assessment and Plan of Treatment: You have been diagnosed with the COVID-19 virus during your hospital stay. You must self quarantine to complete a 10 day time period.  Monitor for fevers, shortness of breath and cough primarily.  Monitor your temperature daily to not any changes and increases.    It has been determined that you no longer need hospitalization and can recover while remaining in self-quarantine at home. You should follow the prevention steps below until a healthcare provider or local or state health department says you can return to your normal activities.  1. You should restrict activities outside your home, except for getting medical care.  2. Do not go to work, school, or public areas.  3. Avoid using public transportation, ride-sharing, or taxis.  4. Separate yourself from other people and animals in your home.  5. Call ahead before visiting your doctor.  6. Wear a facemask.  7. Cover your coughs and sneezes.  8. Clean your hands often.  9. Avoid sharing personal household items.  10. Clean all “high-touch” surfaces everyday.  11. Monitor your symptoms.  If you have a medical emergency and need to call 911, notify the dispatch personnel that you have COVID-19 If possible, put on a facemask before emergency medical services arrive.  12. Stopping home isolation.  Patients with confirmed COVID-19 should remain under home isolation precautions for 10 days since the positive COVID-19 test and until the risk of secondary transmission to others is thought to be low. The decision to discontinue home isolation precautions should be made on a case-by-case basis, in consultation with healthcare providers and state and local health departments. Your [facility] Department of Health can be reached at [phone] for further information about COVID-19.      Diagnosis: Hypothyroidism  Assessment and Plan of Treatment: Continue Synthroid. Follow up with your primary care physician for further monitoring in 1-2 weeks. Please call to arrange appointment.

## 2021-12-28 NOTE — ED PROVIDER NOTE - CLINICAL SUMMARY MEDICAL DECISION MAKING FREE TEXT BOX
51F presenting as a code stroke for LLE numbness. Possible viral syndrome given symptoms from earlier in the day. Plan: labs, CT, ekg, neuro reccs - dispo. Yane Ho, DAIN PGY3

## 2021-12-28 NOTE — DISCHARGE NOTE PROVIDER - NSDCMRMEDTOKEN_GEN_ALL_CORE_FT
aspirin 81 mg oral delayed release tablet: 1 tab(s) orally once a day  Synthroid 75 mcg (0.075 mg) oral tablet: 1 tab(s) orally once a day

## 2021-12-31 ENCOUNTER — EMERGENCY (EMERGENCY)
Facility: HOSPITAL | Age: 51
LOS: 1 days | Discharge: HOME | End: 2021-12-31
Attending: EMERGENCY MEDICINE | Admitting: EMERGENCY MEDICINE
Payer: COMMERCIAL

## 2021-12-31 VITALS
OXYGEN SATURATION: 100 % | DIASTOLIC BLOOD PRESSURE: 94 MMHG | RESPIRATION RATE: 18 BRPM | HEART RATE: 85 BPM | SYSTOLIC BLOOD PRESSURE: 149 MMHG | TEMPERATURE: 98 F

## 2021-12-31 PROCEDURE — 99284 EMERGENCY DEPT VISIT MOD MDM: CPT

## 2021-12-31 NOTE — ED ADULT TRIAGE NOTE - CHIEF COMPLAINT QUOTE
Pt reporting to the Ed for L leg weakness and L arm weakness intermittent since monday. Seen in ED monday and d/c, advised to follow up with neurologist. Pt hand grasp equal and strong, no limb drift noted. Pt is COVID+, Pt denies chest pain or SOB, respirations even and unlabored, spo2 100 % on room air.

## 2022-01-01 VITALS
HEART RATE: 88 BPM | DIASTOLIC BLOOD PRESSURE: 90 MMHG | OXYGEN SATURATION: 100 % | TEMPERATURE: 98 F | SYSTOLIC BLOOD PRESSURE: 130 MMHG | RESPIRATION RATE: 18 BRPM

## 2022-01-01 PROCEDURE — 70450 CT HEAD/BRAIN W/O DYE: CPT | Mod: 26,MA

## 2022-01-01 NOTE — ED PROVIDER NOTE - PROGRESS NOTE DETAILS
Mark Roth D.O., PGY3 (Resident)  [id number] transltor  Patient henmodyanmically stable. No focal neuro deficits. Results endorsed including unexpected incidental findings (copy of reports provided to patient). Return precautions given. Patient expressed verbal understanding. All questions answered.  Will DC with outpatient follow-up.

## 2022-01-01 NOTE — ED PROVIDER NOTE - PHYSICAL EXAMINATION
GENERAL: middle aged female, lying in bed, NAD. Vital signs are within normal limits  EYES: Conjunctiva noninjected or pale, sclera anicteric  HENT: NC/AT, moist mucous membranes  NECK: Supple, trachea midline  LUNG: Nonlabored respirations, no wheezes, rales  CV: RRR, no murmurs, Pulses- Radial/dorsalis pedis: 2+ bilateral and equal  ABDOMEN: Nondistended, nontender,  no guarding  MSK: No visible deformities, nontender extremities  SKIN: No rashes, bruises  NEURO: AAOx4 (to person, place, time, event), no tremor, steady gait, steady tandem gait, no truncal ataxia, no pronator drift, finger to nose smooth and rapid, intact sensation to touch  -Cranial Nerves:  --CN II: PERRL 3mm  --CN III, IV, VI: EOMI bilateral no nystagmus  --CN V1-3: Facial sensation intact to touch  --CN VII: No facial asymmetry or droop  --CN VIII: Hearing intact to rubbing fingers b/l  --CN IX, X: Palate elevates symmetrically. Normal phonation  --CN XI: Heading turning and shoulder shrug intact b/l  --CN XII: Tongue midline with normal movements   PSYCH: Normal mood and affect

## 2022-01-01 NOTE — ED PROVIDER NOTE - OBJECTIVE STATEMENT
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 Patient originally presented for left lower extremity weakness, numbness and fall on 12/27 @ 955pm. Pt underwent CTH which revealed no acute pathology: No intracranial hemorrhage, vasogenic edema or extra-axial collection. Questionable right lateral ventricle intraventricular mass versus asymmetric choroid. Follow-up with contrast-enhanced brain MRI on a nonemergent basis is recommended for further evaluation. Neurology recommended treatment of COVID per primary team. MRI brain and MRA H/N can be done outpatient as symptoms resolved. Echo w/o emergent findings. 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 Patient originally presented for left lower extremity weakness, numbness and fall on 12/27 @ 955pm. Pt underwent CTH which revealed no acute pathology: No intracranial hemorrhage, vasogenic edema or extra-axial collection. Questionable right lateral ventricle intraventricular mass versus asymmetric choroid. Follow-up with contrast-enhanced brain MRI on a nonemergent basis is recommended for further evaluation. Neurology recommended treatment of COVID per primary team. MRI brain and MRA H/N can be done outpatient as symptoms resolved. Echo w/o emergent findings. Patient came to ED today because she had an episode yesterday of "light" feeling of her left side that improves with walking, worse with lying still. Denies chest pain, shortness of breath, weakness, dysphagia, facial droop.     # [id number] april

## 2022-01-01 NOTE — ED PROVIDER NOTE - CLINICAL SUMMARY MEDICAL DECISION MAKING FREE TEXT BOX
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 Vitals wnl. No focal neurologic deficits. Low suspciion for primary nuerovasc pathology. No signs of CVA. Explained to patient given her exam, no new lower extremity motor or sensory changes, bowel or bladder dysfunction, saddle anesthesia, full muscle strength and intact sensaiton at present time, acute intercranial or spinal pathology is unlikely and an emergeny MRI is not needed. Will eval for change in mass with CTH.  Patient is in agreement and amenable to this plan. 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 Vitals wnl. No focal neurologic deficits. Low suspciion for primary nuerovasc pathology. No signs of CVA. Explained to patient given her exam, no new lower extremity motor or sensory changes, bowel or bladder dysfunction, saddle anesthesia, full muscle strength and intact sensaiton at present time, acute intercranial or spinal pathology is unlikely and an emergeny MRI is not needed. Will eval for change in mass with CTH.  Patient is in agreement and amenable to this plan.

## 2022-01-01 NOTE — ED PROVIDER NOTE - NSFOLLOWUPINSTRUCTIONS_ED_ALL_ED_FT
LE VISITARON EN EL ED POR: sensación de ligereza en el lado anne marie del cuerpo    CONTINÚE TODOS JEFF MEDICAMENTOS EN EL HOGAR.    PARA EL DOLOR / FIEBRE, PUEDE RICCO TYLENOL (acetaminofén) Y / O IBUPROFENO (advil o motrin). SIGA LAS INSTRUCCIONES EN LA ETIQUETA / ENVASE.    POR FAVOR TONY UN SEGUIMIENTO CON PINA MÉDICO PRIVADO DENTRO DE LAS PRÓXIMAS 48 HORAS. SHELLY COPIAS DE TUS RESULTADOS.    REGRESE AL DEPARTAMENTO DE EMERGENCIAS SI EXPERIMENTA CUALQUIER SÍNTOMA NUEVA, PREOCUPANTE O EMPLEO.    __________________________________________________________________________________________________________________________    YOU WERE SEEN IN THE ED FOR: light feeling on left side of body    CONTINUE ALL OF YOUR HOME MEDICATIONS.     FOR PAIN/FEVER, YOU MAY TAKE TYLENOL (acetaminophen) AND/OR IBUPROFEN (advil or motrin). FOLLOW THE INSTRUCTIONS ON THE LABEL/CONTAINER.    PLEASE FOLLOW UP WITH YOUR PRIVATE PHYSICIAN WITHIN THE NEXT 48 HOURS. BRING COPIES OF YOUR RESULTS.    RETURN TO THE EMERGENCY DEPARTMENT IF YOU EXPERIENCE ANY NEW/CONCERNING/WORSENING SYMPTOMS.

## 2022-01-01 NOTE — ED PROVIDER NOTE - PATIENT PORTAL LINK FT
You can access the FollowMyHealth Patient Portal offered by [facility] by registering at the following website: [email or web address]. By joining [email or web address]’s FollowMyHealth portal, you will also be able to view your health information using other applications (apps) compatible with our system.

## 2022-01-01 NOTE — ED PROVIDER NOTE - NS ED ROS FT
CONSTITUTIONAL: No fevers, chills, fatigue, dizziness, weakness, unexpected weight change  EYES: No double vision, blurry vision  ENT: No ear pain, nasal congestion, runny nose, sore throat  CV: No chest pain, palpitations  PULM: No cough, shortness of breath  GI: No abdominal pain, nausea, vomiting, diarrhea, constipation  : No dysuria, polyuria, hematuria  SKIN: No rashes, swelling  MSK: No muscle aches  NEURO: + left sided paresthesias. No headache  PSYCHIATRIC: Denies suicidal, homicidal ideations. No auditory, visual, tactile hallucinations

## 2022-01-01 NOTE — ED ADULT NURSE NOTE - OBJECTIVE STATEMENT
received pt to rm 1, A&Ox4, amb. 50y/o female primarily Croatian speaking ( ipad used) hx of encephalitis and hypothyroidism. Pt complaining of left lower extremity weakness, numbness, and fall on 12/27. pt discharged with a neuro follow up. pt also endorses an episode of lightheadedness that worsens with lying still. neuro exam intact. pt VSS. Denies any other acute complaints.

## 2022-01-05 PROBLEM — Z86.19 PERSONAL HISTORY OF OTHER INFECTIOUS AND PARASITIC DISEASES: Chronic | Status: ACTIVE | Noted: 2021-12-28

## 2022-01-05 PROBLEM — E03.9 HYPOTHYROIDISM, UNSPECIFIED: Chronic | Status: ACTIVE | Noted: 2021-12-28

## 2022-01-12 PROBLEM — Z00.00 ENCOUNTER FOR PREVENTIVE HEALTH EXAMINATION: Status: ACTIVE | Noted: 2022-01-12

## 2022-01-12 PROBLEM — Z78.9 OTHER SPECIFIED HEALTH STATUS: Chronic | Status: ACTIVE | Noted: 2022-01-01

## 2022-01-17 ENCOUNTER — APPOINTMENT (OUTPATIENT)
Dept: MRI IMAGING | Facility: CLINIC | Age: 52
End: 2022-01-17

## 2022-01-22 ENCOUNTER — APPOINTMENT (OUTPATIENT)
Dept: MRI IMAGING | Facility: IMAGING CENTER | Age: 52
End: 2022-01-22

## 2023-02-28 NOTE — H&P ADULT - PROBLEM/PLAN-3
New onset vertigo needs an evaluation for cause. I cannot anticipate or presume any particular diagnosis without an examination. If this is a new symptom, she needs to proceed to a local ER or call 911 for that evaluation. We do use meclizine and other motion sickness medications for this, however without knowing the cause, I do not want to delay an assessment and proper care. Thank you. DISPLAY PLAN FREE TEXT

## 2023-11-14 ENCOUNTER — APPOINTMENT (OUTPATIENT)
Dept: ENDOCRINOLOGY | Facility: CLINIC | Age: 53
End: 2023-11-14